# Patient Record
Sex: FEMALE | Race: WHITE | NOT HISPANIC OR LATINO | Employment: OTHER | URBAN - METROPOLITAN AREA
[De-identification: names, ages, dates, MRNs, and addresses within clinical notes are randomized per-mention and may not be internally consistent; named-entity substitution may affect disease eponyms.]

---

## 2018-12-11 ENCOUNTER — OFFICE VISIT (OUTPATIENT)
Dept: NEUROLOGY | Facility: CLINIC | Age: 83
End: 2018-12-11
Payer: MEDICARE

## 2018-12-11 VITALS
WEIGHT: 150 LBS | SYSTOLIC BLOOD PRESSURE: 136 MMHG | HEIGHT: 67 IN | DIASTOLIC BLOOD PRESSURE: 74 MMHG | BODY MASS INDEX: 23.54 KG/M2 | RESPIRATION RATE: 16 BRPM | HEART RATE: 85 BPM

## 2018-12-11 DIAGNOSIS — R41.89 COGNITIVE DECLINE: Primary | ICD-10-CM

## 2018-12-11 PROCEDURE — 99214 OFFICE O/P EST MOD 30 MIN: CPT | Performed by: PSYCHIATRY & NEUROLOGY

## 2018-12-11 RX ORDER — ASCORBIC ACID 500 MG
500 TABLET ORAL DAILY
COMMUNITY

## 2018-12-11 RX ORDER — AMOXICILLIN 500 MG
1200 CAPSULE ORAL DAILY
COMMUNITY

## 2018-12-11 RX ORDER — MULTIVIT-MIN/IRON/FOLIC ACID/K 18-600-40
2000 CAPSULE ORAL DAILY
COMMUNITY

## 2018-12-11 RX ORDER — DIGOXIN 125 UG/1
TABLET ORAL
COMMUNITY
Start: 2018-10-15

## 2018-12-11 RX ORDER — LISINOPRIL 2.5 MG/1
TABLET ORAL
COMMUNITY
Start: 2018-10-15

## 2018-12-11 RX ORDER — BIOTIN 1 MG
1000 TABLET ORAL DAILY
COMMUNITY

## 2018-12-11 NOTE — ASSESSMENT & PLAN NOTE
Unfortunately, both historically and by testing today, patient has demonstrated a further decline in her memory and cognitive capabilities  Although initially the issues were thought to be most likely on a small vessel cerebral vascular basis, given the advancing nature, the amnestic component, and the significant decline in MMSE scoring, I do feel that we are more than likely seeing here an evolving Alzheimer type disease  Would at this point in time like to undertake some additional study to make sure we are not missing any potentially remediable component  --MRI brain regarding any evolving structural change, advancing small vessel ischemic burden, etc   --routine EEG   --baseline blood work to include TSH, B12 and folate levels, along with CBC and CMP, anticipating the possibility of initiating medications on follow-up  --long discussion with daughter and her spouse and with patient regarding above findings and thoughts  Also discussed the situation in the home and all feel that with the current level of oversight and due to the fact the patient is longer driving, that the home situation is a safe situation for now

## 2018-12-11 NOTE — PROGRESS NOTES
Patient is here today for her memory isssues    Patient ID: Quique Patrick is a 82 y o  female  Assessment/Plan:    Memory and Cognitive decline  Unfortunately, both historically and by testing today, patient has demonstrated a further decline in her memory and cognitive capabilities  Although initially the issues were thought to be most likely on a small vessel cerebral vascular basis, given the advancing nature, the amnestic component, and the significant decline in MMSE scoring, I do feel that we are more than likely seeing here an evolving Alzheimer type disease  Would at this point in time like to undertake some additional study to make sure we are not missing any potentially remediable component  --MRI brain regarding any evolving structural change, advancing small vessel ischemic burden, etc   --routine EEG   --baseline blood work to include TSH, B12 and folate levels, along with CBC and CMP, anticipating the possibility of initiating medications on follow-up  --long discussion with daughter and her spouse and with patient regarding above findings and thoughts  Also discussed the situation in the home and all feel that with the current level of oversight and due to the fact the patient is longer driving, that the home situation is a safe situation for now  I spent a total of 35 min with the patient with greater than 50% of that time spent counseling and coordinating her care, specifically discussing her diagnosis, additional tests, and discussing the case with her care team, as detailed above  She will follow up in eight weeks  Subjective:    HPI  Patient, age 82 and right-handed, is known to the practice from several years ago when she was evaluated for short-term memory issues, felt to be more than likely on a retrieval basis and very likely secondary to small vessel cerebral vascular disease  She presents today for additional evaluation in view of apparent advancing difficulties    She was accompanied today by her daughter, Angel Lopez, and Karin Hardy its spouse, Baldev Godinez  Patient herself related no specific insight into any progressing issues with memory, etc   , according to daughter, there has, indeed, been an issue with progressing problems with new memory, with patient becoming more and more repetitive with the passage of time  There has been no evolving behavioral issues  Patient's mood has been described generally as good  Patient continues to live alone but according to the daughter, does well when she is within those particular confines  She does her own light housework  She does her own bill paying and banking and apparently does so without any significant errors  Daughter and spouse supply meals and also make sure that the patient is taking her medications in appropriate fashion, visiting 4-5 times weekly  Patient is no longer driving  Past Medical History:   Diagnosis Date    Atrial fibrillation (HCC)     Chronic low back pain     Dyslipidemia     Heart disease     Migraine     Short-term memory loss     Syncope      Past Surgical History:   Procedure Laterality Date    BACK SURGERY      3 different back surgeries    HIP SURGERY Bilateral      Social History     Social History    Marital status:       Spouse name: N/A    Number of children: N/A    Years of education: N/A     Social History Main Topics    Smoking status: Never Smoker    Smokeless tobacco: Never Used    Alcohol use No    Drug use: No    Sexual activity: Not Asked     Other Topics Concern    None     Social History Narrative    None     Family History   Problem Relation Age of Onset    Heart disease Family      Allergies   Allergen Reactions    Penicillins        Current Outpatient Prescriptions:     ascorbic acid (VITAMIN C) 500 mg tablet, Take 500 mg by mouth daily, Disp: , Rfl:     b complex vitamins tablet, Take 1 tablet by mouth daily, Disp: , Rfl:     Biotin 1000 MCG tablet, Take 1,000 mcg by mouth daily, Disp: , Rfl:     Cholecalciferol (VITAMIN D) 2000 units CAPS, Take 2,000 Units by mouth daily, Disp: , Rfl:     DIGOX 125 MCG tablet, , Disp: , Rfl:     lisinopril (ZESTRIL) 2 5 mg tablet, , Disp: , Rfl:     Omega-3 Fatty Acids (FISH OIL) 1200 MG CAPS, Take 1,200 mg by mouth daily, Disp: , Rfl:     Objective:    Blood pressure 136/74, pulse 85, resp  rate 16, height 5' 7" (1 702 m), weight 68 kg (150 lb)  Physical Exam  Lungs clear to auscultation  Rhythm regular  Neurological Exam  Alert  Pleasantly interactive and appeared in good mood  Answered correctly when asked her name  Incorrectly stated the year  Correctly stated the month  One of three simple object recall  For comparative purposes given a 30 point MMSE and today scored 23 which is seven point decline in comparison to her score of two and half years ago  On an 18 point frontal assessment battery she scored an adequate 15, two point decline in comparison to her previous score  With clock draw, she scored 3/4, closing the cervical and placing the numbers but not correctly placing the hands which is a decline from 4/4 on last occasion  Gait slow but independent  Romberg maneuver performed unremarkably  Cranial nerves 2-12 tested and grossly intact with the exception of a very mild right lower facial asymmetry, which is unchanged from past examinations  Accurate with finger-to-nose bilaterally  No lateralized extremity weakness  No tone increase noted  No tremor appreciated  Pin and position grossly intact throughout  Diffusely and symmetrically hyporeflexic  Toe response downgoing bilaterally  ROS:    Review of Systems   Constitutional: Negative  Negative for appetite change and fever  HENT: Negative  Negative for hearing loss, tinnitus, trouble swallowing and voice change  Eyes: Negative  Negative for photophobia and pain  Respiratory: Negative  Negative for shortness of breath  Cardiovascular: Negative  Negative for palpitations  Gastrointestinal: Negative  Negative for nausea and vomiting  Endocrine: Negative  Negative for cold intolerance and heat intolerance  Genitourinary: Negative  Negative for dysuria, frequency and urgency  Musculoskeletal: Negative  Negative for myalgias and neck pain  Skin: Negative  Negative for rash  Allergic/Immunologic: Negative  Neurological: Negative  Negative for dizziness, tremors, seizures, syncope, facial asymmetry, speech difficulty, weakness, light-headedness, numbness and headaches  Hematological: Negative  Does not bruise/bleed easily  Psychiatric/Behavioral: Negative  Negative for confusion, hallucinations and sleep disturbance  I personally reviewed the ROS that was entered by the medical assistant  *Please note this document was created using voice recognition software and may contain sound-alike word errors  *

## 2018-12-27 ENCOUNTER — HOSPITAL ENCOUNTER (OUTPATIENT)
Dept: MRI IMAGING | Facility: HOSPITAL | Age: 83
Discharge: HOME | End: 2018-12-27
Payer: MEDICARE

## 2018-12-27 ENCOUNTER — APPOINTMENT (OUTPATIENT)
Dept: LAB | Facility: HOSPITAL | Age: 83
End: 2018-12-27
Payer: MEDICARE

## 2018-12-27 DIAGNOSIS — R41.89 COGNITIVE DECLINE: ICD-10-CM

## 2018-12-27 LAB
ALBUMIN SERPL BCP-MCNC: 3.7 G/DL (ref 3.5–5)
ALP SERPL-CCNC: 75 U/L (ref 46–116)
ALT SERPL W P-5'-P-CCNC: 32 U/L (ref 12–78)
ANION GAP SERPL CALCULATED.3IONS-SCNC: 9 MMOL/L (ref 4–13)
AST SERPL W P-5'-P-CCNC: <5 U/L (ref 5–45)
BASOPHILS # BLD AUTO: 0.03 THOUSANDS/ΜL (ref 0–0.1)
BASOPHILS NFR BLD AUTO: 1 % (ref 0–1)
BILIRUB SERPL-MCNC: 0.3 MG/DL (ref 0.2–1)
BUN SERPL-MCNC: 15 MG/DL (ref 5–25)
CALCIUM SERPL-MCNC: 8.4 MG/DL (ref 8.3–10.1)
CHLORIDE SERPL-SCNC: 104 MMOL/L (ref 100–108)
CO2 SERPL-SCNC: 29 MMOL/L (ref 21–32)
CREAT SERPL-MCNC: 0.83 MG/DL (ref 0.6–1.3)
EOSINOPHIL # BLD AUTO: 0.08 THOUSAND/ΜL (ref 0–0.61)
EOSINOPHIL NFR BLD AUTO: 1 % (ref 0–6)
ERYTHROCYTE [DISTWIDTH] IN BLOOD BY AUTOMATED COUNT: 13.2 % (ref 11.6–15.1)
FOLATE SERPL-MCNC: 17.5 NG/ML (ref 3.1–17.5)
GFR SERPL CREATININE-BSD FRML MDRD: 66 ML/MIN/1.73SQ M
GLUCOSE SERPL-MCNC: 159 MG/DL (ref 65–140)
HCT VFR BLD AUTO: 40.5 % (ref 34.8–46.1)
HGB BLD-MCNC: 12.9 G/DL (ref 11.5–15.4)
IMM GRANULOCYTES # BLD AUTO: 0.03 THOUSAND/UL (ref 0–0.2)
IMM GRANULOCYTES NFR BLD AUTO: 1 % (ref 0–2)
LYMPHOCYTES # BLD AUTO: 1.22 THOUSANDS/ΜL (ref 0.6–4.47)
LYMPHOCYTES NFR BLD AUTO: 19 % (ref 14–44)
MCH RBC QN AUTO: 31.7 PG (ref 26.8–34.3)
MCHC RBC AUTO-ENTMCNC: 31.9 G/DL (ref 31.4–37.4)
MCV RBC AUTO: 100 FL (ref 82–98)
MONOCYTES # BLD AUTO: 0.31 THOUSAND/ΜL (ref 0.17–1.22)
MONOCYTES NFR BLD AUTO: 5 % (ref 4–12)
NEUTROPHILS # BLD AUTO: 4.83 THOUSANDS/ΜL (ref 1.85–7.62)
NEUTS SEG NFR BLD AUTO: 73 % (ref 43–75)
NRBC BLD AUTO-RTO: 0 /100 WBCS
PLATELET # BLD AUTO: 197 THOUSANDS/UL (ref 149–390)
PMV BLD AUTO: 10.2 FL (ref 8.9–12.7)
POTASSIUM SERPL-SCNC: 4.3 MMOL/L (ref 3.5–5.3)
PROT SERPL-MCNC: 7 G/DL (ref 6.4–8.2)
RBC # BLD AUTO: 4.07 MILLION/UL (ref 3.81–5.12)
SODIUM SERPL-SCNC: 142 MMOL/L (ref 136–145)
TSH SERPL DL<=0.05 MIU/L-ACNC: 1.43 UIU/ML (ref 0.36–3.74)
VIT B12 SERPL-MCNC: 314 PG/ML (ref 100–900)
WBC # BLD AUTO: 6.5 THOUSAND/UL (ref 4.31–10.16)

## 2018-12-27 PROCEDURE — 82746 ASSAY OF FOLIC ACID SERUM: CPT

## 2018-12-27 PROCEDURE — 85025 COMPLETE CBC W/AUTO DIFF WBC: CPT

## 2018-12-27 PROCEDURE — 70551 MRI BRAIN STEM W/O DYE: CPT

## 2018-12-27 PROCEDURE — 80053 COMPREHEN METABOLIC PANEL: CPT

## 2018-12-27 PROCEDURE — 82607 VITAMIN B-12: CPT

## 2018-12-27 PROCEDURE — 36415 COLL VENOUS BLD VENIPUNCTURE: CPT

## 2018-12-27 PROCEDURE — 84443 ASSAY THYROID STIM HORMONE: CPT

## 2018-12-31 ENCOUNTER — HOSPITAL ENCOUNTER (OUTPATIENT)
Dept: NEUROLOGY | Facility: CLINIC | Age: 83
Discharge: HOME | End: 2018-12-31
Payer: MEDICARE

## 2018-12-31 DIAGNOSIS — R41.89 COGNITIVE DECLINE: ICD-10-CM

## 2018-12-31 PROCEDURE — 95819 EEG AWAKE AND ASLEEP: CPT | Performed by: PSYCHIATRY & NEUROLOGY

## 2018-12-31 PROCEDURE — 95816 EEG AWAKE AND DROWSY: CPT

## 2019-01-02 ENCOUNTER — OFFICE VISIT (OUTPATIENT)
Dept: NEUROLOGY | Facility: CLINIC | Age: 84
End: 2019-01-02
Payer: MEDICARE

## 2019-01-02 VITALS
HEART RATE: 80 BPM | BODY MASS INDEX: 24.01 KG/M2 | RESPIRATION RATE: 16 BRPM | WEIGHT: 153 LBS | HEIGHT: 67 IN | SYSTOLIC BLOOD PRESSURE: 134 MMHG | DIASTOLIC BLOOD PRESSURE: 76 MMHG

## 2019-01-02 DIAGNOSIS — R41.89 COGNITIVE DECLINE: Primary | ICD-10-CM

## 2019-01-02 DIAGNOSIS — G93.89 CEREBRAL VENTRICULOMEGALY: ICD-10-CM

## 2019-01-02 PROCEDURE — 99214 OFFICE O/P EST MOD 30 MIN: CPT | Performed by: PSYCHIATRY & NEUROLOGY

## 2019-01-02 NOTE — LETTER
January 2, 2019     Amanda Tobias MD  [address]    Patient: Kasey Robison   YOB: 1935   Date of Visit: 1/2/2019       Dear Dr Balbuena Mayor: Thank you for referring Rox Stapleton to me for evaluation  Below are my notes for this consultation  If you have questions, please do not hesitate to call me  I look forward to following your patient along with you  Sincerely,        Emily Armando MD        CC: No Recipients  Emily Armando MD  1/2/2019  3:10 PM  Sign at close encounter  Patient is here today for her memory issues    Patient ID: Kasey Robison is a 82 y o  female  Assessment/Plan:    Memory and Cognitive decline  Given the decline on mini-mental status testing, of her advanced age and the history supporting her problem to be predominantly on amnestic basis, I suspect that this does represent an early evolving out Alzheimer's disease  In addition, there is what appears to be exaggerated atrophy involving the mesial temporal areas bilaterally by brain MRI  However, question raised by the reading radiologist of the potential for a communicating hydrocephalus (NPH) given what appears to be an enlargement of her ventricles beyond that expected for the degree of cerebral atrophy  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 and at least from assist typical clinical standpoint she does not fit the usual template for NPH  Nonetheless, I would like to have this further evaluated through Neurosurgery  --referred to Neurosurgery regarding the possibility of NPH verses atrophic ventriculomegaly (central atrophy)  --although her B12 level is considered normal, I would like to see it in the geriatric population over 400  I have asked that she begin a specific B12 1000 mcg daily oral supplement  --follow-up here after her Neurosurgery goal assessment likely to include functional brain imaging with PET/CT    --patient currently living alone but with oversight by family and family raises no concerns regarding safety issues (and patient no longer operating a motor vehicle)  I spent a total of 30 min with the patient with greater than 50% of that time spent counseling and coordinating her care, specifically discussing her diagnosis, additional tests, and discussing the case with her care team, as detailed above  She will follow up here following her evaluation by Neurosurgery  Subjective:    HPI  Patient, age 82 years, continues her ongoing care here given evolving memory and cognitive issues  Initially, her difficulties were felt most likely to be on a retrieval basis and secondary to small vessel cerebrovascular disease  However, with the passage of time, she has had increasing issues predominantly with short-term memory, now raising the question of a potential problem with early out Alzheimer's type disease  With her evolving issues, additional study has been undertaken  These results were reviewed with patient and her daughter, Lisa Owen, and Yana's a spouse, Dinorah Baptiste  TSH normal at 1 425  Folate normal at 17 3  B12 level 314  CMP was essentially unremarkable except for a glucose of 159, nonfasting  CBC with hemoglobin 12 9, crit 40 5, white count 6 50 and platelet count 082  An EEG was a normal study  MRI brain demonstrated evidence of atrophy which appeared exaggerated involving the mesial temporal regions bilaterally  However, comment was made by the reading radiologist of enlarged ventricles, appearing to be somewhat out of proportion with the degree of cortical atrophy  As result, the potential for communicating hydrocephalus (NPH) was raised  Again, the most prominent feature in the progression here has been that of short-term memory issues  She has had no overt gait issues and has not had any evolving problems with incontinence      Past Medical History:   Diagnosis Date    Atrial fibrillation ([facility] [address] )  Chronic low back pain     Dyslipidemia     Heart disease     Migraine     Short-term memory loss     Syncope      Past Surgical History:   Procedure Laterality Date    BACK SURGERY      3 different back surgeries    HIP SURGERY Bilateral      Social History     Social History    Marital status:      Spouse name: N/A    Number of children: N/A    Years of education: N/A     Social History Main Topics    Smoking status: Never Smoker    Smokeless tobacco: Never Used    Alcohol use No    Drug use: No    Sexual activity: Not Asked     Other Topics Concern    None     Social History Narrative    None     Family History   Problem Relation Age of Onset    Heart disease Family      Allergies   Allergen Reactions    Celecoxib      Other reaction(s): Other (See Comments)  depression    Gabapentin     Methocarbamol     Other Other (See Comments)     depression    Penicillins        Current Outpatient Prescriptions:     ascorbic acid (VITAMIN C) 500 mg tablet, Take 500 mg by mouth daily, Disp: , Rfl:     b complex vitamins tablet, Take 1 tablet by mouth daily, Disp: , Rfl:     Biotin 1000 MCG tablet, Take 1,000 mcg by mouth daily, Disp: , Rfl:     Cholecalciferol (VITAMIN D) 2000 units CAPS, Take 2,000 Units by mouth daily, Disp: , Rfl:     DIGOX 125 MCG tablet, , Disp: , Rfl:     lisinopril (ZESTRIL) 2 5 mg tablet, , Disp: , Rfl:     Omega-3 Fatty Acids (FISH OIL) 1200 MG CAPS, Take 1,200 mg by mouth daily, Disp: , Rfl:     Objective:    Blood pressure 134/76, pulse 80, resp  rate 16, height 5' 7" (1 702 m), weight 69 4 kg (153 lb)  Physical Exam  Lungs clear to auscultation  Rhythm regular  Neurological Exam  Alert  Once again she was pleasantly interactive and appeared in good mood  Baseline neuro cognitive testing was not performed today as was just done on her last appointment    In review, she scored on that occasion 23/30 on MMSE, seven point decline in comparison to her score of two and half years earlier  On an 18 point frontal assessment battery her scores 15, a two point decline in comparison to her previous scoring and she was a 3/4 on clock draw all (previously 4/4)  With gait assessment, she was gait stable although had perhaps a very modestly apractic appearance  Romberg maneuver was performed unremarkably  Cranial nerves 2-12 assessed and intact with the exception of a very modest right lower facial asymmetry, unchanged from prior examinations  Rest tone was not appreciably increased  No tremor was appreciated  No lateralized extremity weakness  Diffusely and symmetrically hyporeflexic  ROS:    Review of Systems   Constitutional: Negative  Negative for appetite change and fever  HENT: Negative  Negative for hearing loss, tinnitus, trouble swallowing and voice change  Eyes: Negative  Negative for photophobia and pain  Respiratory: Negative  Negative for shortness of breath  Cardiovascular: Negative  Negative for palpitations  Gastrointestinal: Negative  Negative for nausea and vomiting  Endocrine: Negative  Negative for cold intolerance and heat intolerance  Genitourinary: Negative  Negative for dysuria, frequency and urgency  Musculoskeletal: Negative  Negative for myalgias and neck pain  Skin: Negative  Negative for rash  Neurological: Negative  Negative for dizziness, tremors, seizures, syncope, facial asymmetry, speech difficulty, weakness, light-headedness, numbness and headaches  Hematological: Negative  Does not bruise/bleed easily  Psychiatric/Behavioral: Negative  Negative for confusion, hallucinations and sleep disturbance  I personally reviewed the ROS that was entered by the medical assistant  *Please note this document was created using voice recognition software and may contain sound-alike word errors  *

## 2019-01-02 NOTE — PATIENT INSTRUCTIONS
Begin an oral B12 supplement 1000 mcg daily  Please call the office with an update after you see Neurosurgery

## 2019-01-02 NOTE — PROGRESS NOTES
Patient is here today for her memory issues    Patient ID: Manisha Singh is a 82 y o  female  Assessment/Plan:    Memory and Cognitive decline  Given the decline on mini-mental status testing, of her advanced age and the history supporting her problem to be predominantly on amnestic basis, I suspect that this does represent an early evolving out Alzheimer's disease  In addition, there is what appears to be exaggerated atrophy involving the mesial temporal areas bilaterally by brain MRI  However, question raised by the reading radiologist of the potential for a communicating hydrocephalus (NPH) given what appears to be an enlargement of her ventricles beyond that expected for the degree of cerebral atrophy  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 and at least from assist typical clinical standpoint she does not fit the usual template for NPH  Nonetheless, I would like to have this further evaluated through Neurosurgery  --referred to Neurosurgery regarding the possibility of NPH verses atrophic ventriculomegaly (central atrophy)  --although her B12 level is considered normal, I would like to see it in the geriatric population over 400  I have asked that she begin a specific B12 1000 mcg daily oral supplement  --follow-up here after her Neurosurgery goal assessment likely to include functional brain imaging with PET/CT  --patient currently living alone but with oversight by family and family raises no concerns regarding safety issues (and patient no longer operating a motor vehicle)  --above issues reviewed in detail with patient, Daren and Kin Smith  I spent a total of 30 min with the patient with greater than 50% of that time spent counseling and coordinating her care, specifically discussing her diagnosis, additional tests, and discussing the case with her care team, as detailed above        She will follow up here following her evaluation by Neurosurgery  Subjective:    HPI  Patient, age 82 years, continues her ongoing care here given evolving memory and cognitive issues  Initially, her difficulties were felt most likely to be on a retrieval basis and secondary to small vessel cerebrovascular disease  However, with the passage of time, she has had increasing issues predominantly with short-term memory, now raising the question of a potential problem with early out Alzheimer's type disease  With her evolving issues, additional study has been undertaken  These results were reviewed with patient and her daughter, Erin Harmon, and Yana's a spouse, Brittany Muñiz  TSH normal at 1 425  Folate normal at 17 3  B12 level 314  CMP was essentially unremarkable except for a glucose of 159, nonfasting  CBC with hemoglobin 12 9, crit 40 5, white count 6 50 and platelet count 985  An EEG was a normal study  MRI brain demonstrated evidence of atrophy which appeared exaggerated involving the mesial temporal regions bilaterally  However, comment was made by the reading radiologist of enlarged ventricles, appearing to be somewhat out of proportion with the degree of cortical atrophy  As result, the potential for communicating hydrocephalus (NPH) was raised  Again, the most prominent feature in the progression here has been that of short-term memory issues  She has had no overt gait issues and has not had any evolving problems with incontinence  Past Medical History:   Diagnosis Date    Atrial fibrillation (HCC)     Chronic low back pain     Dyslipidemia     Heart disease     Migraine     Short-term memory loss     Syncope      Past Surgical History:   Procedure Laterality Date    BACK SURGERY      3 different back surgeries    HIP SURGERY Bilateral      Social History     Social History    Marital status:       Spouse name: N/A    Number of children: N/A    Years of education: N/A     Social History Main Topics    Smoking status: Never Smoker    Smokeless tobacco: Never Used    Alcohol use No    Drug use: No    Sexual activity: Not Asked     Other Topics Concern    None     Social History Narrative    None     Family History   Problem Relation Age of Onset    Heart disease Family      Allergies   Allergen Reactions    Celecoxib      Other reaction(s): Other (See Comments)  depression    Gabapentin     Methocarbamol     Other Other (See Comments)     depression    Penicillins        Current Outpatient Prescriptions:     ascorbic acid (VITAMIN C) 500 mg tablet, Take 500 mg by mouth daily, Disp: , Rfl:     b complex vitamins tablet, Take 1 tablet by mouth daily, Disp: , Rfl:     Biotin 1000 MCG tablet, Take 1,000 mcg by mouth daily, Disp: , Rfl:     Cholecalciferol (VITAMIN D) 2000 units CAPS, Take 2,000 Units by mouth daily, Disp: , Rfl:     DIGOX 125 MCG tablet, , Disp: , Rfl:     lisinopril (ZESTRIL) 2 5 mg tablet, , Disp: , Rfl:     Omega-3 Fatty Acids (FISH OIL) 1200 MG CAPS, Take 1,200 mg by mouth daily, Disp: , Rfl:     Objective:    Blood pressure 134/76, pulse 80, resp  rate 16, height 5' 7" (1 702 m), weight 69 4 kg (153 lb)  Physical Exam  Lungs clear to auscultation  Rhythm regular  Neurological Exam  Alert  Once again she was pleasantly interactive and appeared in good mood  Baseline neuro cognitive testing was not performed today as was just done on her last appointment  In review, she scored on that occasion 23/30 on MMSE, seven point decline in comparison to her score of two and half years earlier  On an 18 point frontal assessment battery her scores 15, a two point decline in comparison to her previous scoring and she was a 3/4 on clock draw all (previously 4/4)  With gait assessment, she was gait stable although had perhaps a very modestly apractic appearance  Romberg maneuver was performed unremarkably    Cranial nerves 2-12 assessed and intact with the exception of a very modest right lower facial asymmetry, unchanged from prior examinations  Rest tone was not appreciably increased  No tremor was appreciated  No lateralized extremity weakness  Diffusely and symmetrically hyporeflexic  ROS:    Review of Systems   Constitutional: Negative  Negative for appetite change and fever  HENT: Negative  Negative for hearing loss, tinnitus, trouble swallowing and voice change  Eyes: Negative  Negative for photophobia and pain  Respiratory: Negative  Negative for shortness of breath  Cardiovascular: Negative  Negative for palpitations  Gastrointestinal: Negative  Negative for nausea and vomiting  Endocrine: Negative  Negative for cold intolerance and heat intolerance  Genitourinary: Negative  Negative for dysuria, frequency and urgency  Musculoskeletal: Negative  Negative for myalgias and neck pain  Skin: Negative  Negative for rash  Neurological: Negative  Negative for dizziness, tremors, seizures, syncope, facial asymmetry, speech difficulty, weakness, light-headedness, numbness and headaches  Hematological: Negative  Does not bruise/bleed easily  Psychiatric/Behavioral: Negative  Negative for confusion, hallucinations and sleep disturbance  I personally reviewed the ROS that was entered by the medical assistant  *Please note this document was created using voice recognition software and may contain sound-alike word errors  *

## 2019-01-02 NOTE — ASSESSMENT & PLAN NOTE
Given the decline on mini-mental status testing, of her advanced age and the history supporting her problem to be predominantly on amnestic basis, I suspect that this does represent an early evolving out Alzheimer's disease  In addition, there is what appears to be exaggerated atrophy involving the mesial temporal areas bilaterally by brain MRI  However, question raised by the reading radiologist of the potential for a communicating hydrocephalus (NPH) given what appears to be an enlargement of her ventricles beyond that expected for the degree of cerebral atrophy  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 and at least from assist typical clinical standpoint she does not fit the usual template for NPH  Nonetheless, I would like to have this further evaluated through Neurosurgery  --referred to Neurosurgery regarding the possibility of NPH verses atrophic ventriculomegaly (central atrophy)  --although her B12 level is considered normal, I would like to see it in the geriatric population over 400  I have asked that she begin a specific B12 1000 mcg daily oral supplement  --follow-up here after her Neurosurgery goal assessment likely to include functional brain imaging with PET/CT  --patient currently living alone but with oversight by family and family raises no concerns regarding safety issues (and patient no longer operating a motor vehicle)  --above issues reviewed in detail with patient, Jesus Nevarez and Troy Chin

## 2019-01-04 ENCOUNTER — DOCUMENTATION (OUTPATIENT)
Dept: NEUROSURGERY | Facility: CLINIC | Age: 84
End: 2019-01-04

## 2019-01-04 DIAGNOSIS — R26.9 GAIT DISTURBANCE: Primary | ICD-10-CM

## 2019-01-04 NOTE — PROGRESS NOTES
Dear Carol Villalta,     As discussed with Zoey Timmons, you have been referred for further evaluation at the NPH (normal pressure hydrocephalus) Center  The following appointments have been scheduled:     Friday, November 18th ([address] [place] [address])    · PT (physical therapy) gait assessment at 9:45am   · appointment in our office with Dr Michelle Cisse to follow completion of PT at 10:30am      Both PT and our office are on the 2nd floor of the Medical Office Building ([facility]) at [address] [place] [address]; [address]  This is located at the intersection of [facility] and [facility]  If you use a cane or walker, please be sure to bring these or any other assistive devices with you  I am enclosing information about NPH and the NPH Center, along with paperwork for our office  Please complete prior and bring to your appointment with you  Please bring your photo ID and insurance card with you  If you have any questions or need to cancel or reschedule, please call me directly at [phone]  Thank you           Papito Pedersen, THUAN  Coordinator, NPH Rigoberto Richardson MD  Director, [address] [place] [address]

## 2019-01-17 ENCOUNTER — TELEPHONE (OUTPATIENT)
Dept: NEUROSURGERY | Facility: CLINIC | Age: 84
End: 2019-01-17

## 2019-01-17 NOTE — TELEPHONE ENCOUNTER
Barbar Bosworth requesting to reschedule Indira's NPH appt for Friday due to impending inclement weather  Patient was tentatively rescheduled for Thurs, Jan 31st: PT @ noon, appt with DR @ 1pm   LMOM for Jack Scherer to call me back to confirm

## 2019-01-28 ENCOUNTER — TELEPHONE (OUTPATIENT)
Dept: NEUROSURGERY | Facility: CLINIC | Age: 84
End: 2019-01-28

## 2019-01-28 NOTE — TELEPHONE ENCOUNTER
Received call back from Daren  She said that that time will not work for her  She needs a Friday morning  Next Friday that Dr Drake Bliss will be at [facility] is 3/1 and Daren was okay with that  Patient scheduled for 3/1/19 PT @ 9am, appt with DR Amarjit Stafford was appreciative

## 2019-03-01 ENCOUNTER — OFFICE VISIT (OUTPATIENT)
Dept: PHYSICAL THERAPY | Facility: CLINIC | Age: 84
End: 2019-03-01
Payer: MEDICARE

## 2019-03-01 ENCOUNTER — OFFICE VISIT (OUTPATIENT)
Dept: NEUROSURGERY | Facility: CLINIC | Age: 84
End: 2019-03-01
Payer: MEDICARE

## 2019-03-01 VITALS
WEIGHT: 145 LBS | TEMPERATURE: 97.8 F | BODY MASS INDEX: 22.76 KG/M2 | HEIGHT: 67 IN | DIASTOLIC BLOOD PRESSURE: 78 MMHG | RESPIRATION RATE: 18 BRPM | SYSTOLIC BLOOD PRESSURE: 130 MMHG

## 2019-03-01 DIAGNOSIS — G93.89 CEREBRAL VENTRICULOMEGALY: ICD-10-CM

## 2019-03-01 DIAGNOSIS — R41.89 COGNITIVE DECLINE: ICD-10-CM

## 2019-03-01 DIAGNOSIS — R26.9 ABNORMALITY OF GAIT: Primary | ICD-10-CM

## 2019-03-01 PROCEDURE — 97161 PT EVAL LOW COMPLEX 20 MIN: CPT | Performed by: PHYSICAL THERAPIST

## 2019-03-01 PROCEDURE — 99203 OFFICE O/P NEW LOW 30 MIN: CPT | Performed by: NEUROLOGICAL SURGERY

## 2019-03-01 NOTE — PROGRESS NOTES
Office Note - Neurosurgery   Kasey Robison 82 y o  female MRN: [id number]      Assessment:    Patient is gradually worsening  21-year-old woman with progressive cognitive decline which is likely multifactorial, though I suspect Alzheimer's is a predominant factor  She meets criteria for unlikely normal pressure hydrocephalus based on 2005 guidelines  Given her pre-existing gait difficulties, age and competing diagnosis for cognitive decline, would not recommend any further evaluation for suitability for CSF diversion  The patient herself is not interested in pursuing any form of surgical intervention and is quite clear that she would not be interested in participating in postoperative efforts to optimize her function  There are no plans for further follow up through this office  She will follow up with her neurologist     Thank you for referring this patient to the integrated normal pressure hydrocephalus program                                      History, physical examination and diagnostic tests were reviewed and questions answered  Diagnosis, care plan and treatment options were discussed  The patient and daughters understand instructions and will follow up as directed  Plan:    Follow-up: prn    Problem List Items Addressed This Visit        Other    Memory and Cognitive decline      Other Visit Diagnoses     Cerebral ventriculomegaly              Subjective/Objective     Chief Complaint    Progressive memory decline  HPI    21-year-old right-hand-dominant woman accompanied by her daughters today  She has at least 3 year history of progressive decline in cognition mostly with short-term memory though long-term memory can be affected is where  She has had longstanding issues with gait and balance following 3 lower back surgeries and bilateral hip replacements and a chronic left-sided foot drop for which she refuses to wear an AFO  She denies any urinary incontinence    She denies any headaches, nausea vomiting or change in vision  She is able to look after own finances and look after her dog and seems relatively self-sufficient in a familiar environment  She presents today for evaluation in the integrated normal pressure hydrocephalus program       CRUZ ARROYO personally reviewed  Review of Systems   Constitutional: Positive for fatigue  HENT: Negative  Eyes: Negative  Respiratory: Negative  Cardiovascular: Negative  Gastrointestinal: Negative  Endocrine: Negative  Genitourinary: Positive for urgency  Musculoskeletal: Positive for back pain  Skin: Negative  Allergic/Immunologic: Negative  Hematological: Negative  Psychiatric/Behavioral: Positive for confusion and decreased concentration  Family reports both short-term and long-term memory loss       Family History    Family History   Problem Relation Age of Onset    Heart disease Family        Social History    Social History     Socioeconomic History    Marital status:       Spouse name: Not on file    Number of children: Not on file    Years of education: Not on file    Highest education level: Not on file   Occupational History    Not on file   Social Needs    Financial resource strain: Not on file    Food insecurity:     Worry: Not on file     Inability: Not on file    Transportation needs:     Medical: Not on file     Non-medical: Not on file   Tobacco Use    Smoking status: Never Smoker    Smokeless tobacco: Never Used   Substance and Sexual Activity    Alcohol use: No    Drug use: No    Sexual activity: Not on file   Lifestyle    Physical activity:     Days per week: Not on file     Minutes per session: Not on file    Stress: Not on file   Relationships    Social connections:     Talks on phone: Not on file     Gets together: Not on file     Attends Religion service: Not on file     Active member of club or organization: Not on file     Attends meetings of clubs or organizations: Not on file     Relationship status: Not on file    Intimate partner violence:     Fear of current or ex partner: Not on file     Emotionally abused: Not on file     Physically abused: Not on file     Forced sexual activity: Not on file   Other Topics Concern    Not on file   Social History Narrative    Not on file       Past Medical History    Past Medical History:   Diagnosis Date    Atrial fibrillation ([facility] [address] )     Chronic low back pain     Dyslipidemia     Heart disease     Migraine     Short-term memory loss     Syncope        Surgical History    Past Surgical History:   Procedure Laterality Date    BACK SURGERY      3 different back surgeries    HIP SURGERY Bilateral        Medications      Current Outpatient Medications:     ascorbic acid (VITAMIN C) 500 mg tablet, Take 500 mg by mouth daily, Disp: , Rfl:     b complex vitamins tablet, Take 1 tablet by mouth daily, Disp: , Rfl:     Biotin 1000 MCG tablet, Take 1,000 mcg by mouth daily, Disp: , Rfl:     Cholecalciferol (VITAMIN D) 2000 units CAPS, Take 2,000 Units by mouth daily, Disp: , Rfl:     DIGOX 125 MCG tablet, , Disp: , Rfl:     lisinopril (ZESTRIL) 2 5 mg tablet, , Disp: , Rfl:     Omega-3 Fatty Acids (FISH OIL) 1200 MG CAPS, Take 1,200 mg by mouth daily, Disp: , Rfl:     Allergies    Allergies   Allergen Reactions    Celecoxib      Other reaction(s): Other (See Comments)  depression    Gabapentin     Methocarbamol     Other Other (See Comments)     depression    Penicillins        The following portions of the patient's history were reviewed and updated as appropriate: allergies, current medications, past family history, past medical history, past social history, past surgical history and problem list     Investigations    I personally reviewed the MRI, NPH PT and NPH Cognitive results with the patient:    MRI of the brain without contrast dated December 27th, 2018    Diffuse cortical atrophy, with mesial temporal structures showing significant atrophy  Dilatation of the temporal horns accordingly  Ventricular system is mildly dilated out of proportion to degree of cortical atrophy  White matter signal change consistent with small vessel ischemic disease  NPH scale dated 3/1/2019, 13/15  PT gait assessment dated 3/1/2019  TUG 27 sec , TUGc 50 sec, DGI 12/24   MoCA dated 3/1/2019, 19/30  Physical Exam    Vitals:  Blood pressure 130/78, temperature 97 8 °F (36 6 °C), resp  rate 18, height 5' 7" (1 702 m), weight 65 8 kg (145 lb)  ,Body mass index is 22 71 kg/m²  Physical Exam   Constitutional: She appears well-developed and well-nourished  No distress  Eyes: EOM are normal    Pulmonary/Chest: Effort normal  No respiratory distress  Musculoskeletal:   Slow to stand from a seated position secondary to back pain  Neurological: She is alert  No cranial nerve deficit or sensory deficit  5/5 power in upper lower extremities, aside from left footdrop  No pronator or parietal drift  Walks with a steady but mildly wide-based gait with left footdrop  No shuffling  Turns in 2 steps  Skin: Skin is warm and dry  Psychiatric: She has a normal mood and affect  Her speech is normal  Cognition and memory are impaired  She expresses impulsivity  Patient repetitively describes her dog lady  Does not recall recent events including physical therapy  Vitals reviewed      Neurologic Exam     Mental Status   Speech: speech is normal     Cranial Nerves     CN III, IV, VI   Extraocular motions are normal